# Patient Record
Sex: FEMALE | Race: NATIVE HAWAIIAN OR OTHER PACIFIC ISLANDER | URBAN - METROPOLITAN AREA
[De-identification: names, ages, dates, MRNs, and addresses within clinical notes are randomized per-mention and may not be internally consistent; named-entity substitution may affect disease eponyms.]

---

## 2022-04-14 ENCOUNTER — OFFICE VISIT (OUTPATIENT)
Dept: URBAN - METROPOLITAN AREA CLINIC 54 | Facility: CLINIC | Age: 47
End: 2022-04-14
Payer: COMMERCIAL

## 2022-04-14 ENCOUNTER — WEB ENCOUNTER (OUTPATIENT)
Dept: URBAN - METROPOLITAN AREA CLINIC 54 | Facility: CLINIC | Age: 47
End: 2022-04-14

## 2022-04-14 DIAGNOSIS — K80.50 BILIARY COLIC: ICD-10-CM

## 2022-04-14 DIAGNOSIS — R79.89 ELEVATED LIVER FUNCTION TESTS: ICD-10-CM

## 2022-04-14 PROCEDURE — 99204 OFFICE O/P NEW MOD 45 MIN: CPT | Performed by: INTERNAL MEDICINE

## 2022-04-14 RX ORDER — TRAMADOL HYDROCHLORIDE 50 MG/1
1 TABLET AS NEEDED TABLET, FILM COATED ORAL ONCE A DAY
Status: DISCONTINUED | COMMUNITY

## 2022-04-14 RX ORDER — MELOXICAM 15 MG/1
1 TABLET TABLET ORAL ONCE A DAY
Status: ACTIVE | COMMUNITY

## 2022-04-14 RX ORDER — LISINOPRIL 40 MG/1
1 TABLET TABLET ORAL ONCE A DAY
Status: ACTIVE | COMMUNITY

## 2022-04-14 RX ORDER — ONDANSETRON 4 MG/1
2 TABLETS ON THE TONGUE AND ALLOW TO DISSOLVE  AS NEEDED TABLET, ORALLY DISINTEGRATING ORAL
Status: DISCONTINUED | COMMUNITY

## 2022-04-14 RX ORDER — ALLOPURINOL 100 MG/1
1 TABLET TABLET ORAL ONCE A DAY
Status: ACTIVE | COMMUNITY

## 2022-04-14 RX ORDER — NITROFURANTOIN MACROCRYSTALS 100 MG/1
1 CAPSULE AT BEDTIME WITH FOOD OR MILK CAPSULE ORAL ONCE A DAY
Status: DISCONTINUED | COMMUNITY

## 2022-04-14 NOTE — HPI-TODAY'S VISIT:
46-year-old obese lady presenting to GI clinic after recent hospital presentation for right upper quadrant pain with a obstructive LFTs. Patient reports 3 episodes of right upper quadrant pain radiating to the middle of her back.  This last episode caused her to seek medical attention.  She reports the pain lasting 30 minutes postprandial.  She had some nausea and vomiting.  She presented to the ED on 315.  She was found to have an AST of 220 and ALT of 221 pill of 1.0 and alk phos of 321.  She had a normal white count.  Her lipase was normal.  Abdominal ultrasound showed multiple gallstones and a common bile duct of 5 mm.  Her pain resolved and she was discharged from the ER.

## 2022-04-15 LAB
A/G RATIO: 2
ALBUMIN: 4.9
ALKALINE PHOSPHATASE: 84
ALT (SGPT): 16
AST (SGOT): 20
BILIRUBIN, TOTAL: 0.3
BUN/CREATININE RATIO: 46
BUN: 42
CALCIUM: 9.9
CARBON DIOXIDE, TOTAL: 14
CHLORIDE: 110
CREATININE: 0.92
EGFR: 77
GGT: 94
GLOBULIN, TOTAL: 2.4
GLUCOSE: 89
HEMATOCRIT: 35.6
HEMOGLOBIN: 11.2
MCH: 29.6
MCHC: 31.5
MCV: 94
NRBC: (no result)
PLATELETS: 253
POTASSIUM: 5.1
PROTEIN, TOTAL: 7.3
RBC: 3.78
RDW: 13.1
SODIUM: 141
WBC: 6

## 2022-04-18 ENCOUNTER — TELEPHONE ENCOUNTER (OUTPATIENT)
Dept: URBAN - METROPOLITAN AREA CLINIC 54 | Facility: CLINIC | Age: 47
End: 2022-04-18

## 2022-05-27 ENCOUNTER — DASHBOARD ENCOUNTERS (OUTPATIENT)
Age: 47
End: 2022-05-27

## 2022-05-27 ENCOUNTER — OFFICE VISIT (OUTPATIENT)
Dept: URBAN - METROPOLITAN AREA CLINIC 54 | Facility: CLINIC | Age: 47
End: 2022-05-27
Payer: COMMERCIAL

## 2022-05-27 DIAGNOSIS — K80.20 SYMPTOMATIC CHOLELITHIASIS: ICD-10-CM

## 2022-05-27 DIAGNOSIS — Z12.11 COLON CANCER SCREENING: ICD-10-CM

## 2022-05-27 PROBLEM — 266474003: Status: ACTIVE | Noted: 2022-05-27

## 2022-05-27 PROCEDURE — 99214 OFFICE O/P EST MOD 30 MIN: CPT | Performed by: INTERNAL MEDICINE

## 2022-05-27 RX ORDER — SODIUM, POTASSIUM,MAG SULFATES 17.5-3.13G
354 ML SOLUTION, RECONSTITUTED, ORAL ORAL
Qty: 354 ML | Refills: 0 | OUTPATIENT
Start: 2022-05-27 | End: 2022-05-28

## 2022-05-27 RX ORDER — MELOXICAM 15 MG/1
1 TABLET TABLET ORAL ONCE A DAY
Status: ACTIVE | COMMUNITY

## 2022-05-27 RX ORDER — LISINOPRIL 40 MG/1
1 TABLET TABLET ORAL ONCE A DAY
Status: ACTIVE | COMMUNITY

## 2022-05-27 RX ORDER — ALLOPURINOL 100 MG/1
1 TABLET TABLET ORAL ONCE A DAY
Status: ACTIVE | COMMUNITY

## 2022-05-27 NOTE — HPI-TODAY'S VISIT:
46-year-old obese lady found to have right upper quadrant pain with obstructive LFTs.  She was having recurrent episodes when I saw her last GI clinic.  Her LFTs normalized abdominal ultrasound showed cholelithiasis with a common bile duct measuring 5 mm.  We subsequently got an MRCP which also concluded no filling defects within the common bile duct.  Patient feels well without any recurrent abdominal pain episodes.

## 2022-05-28 LAB
ALBUMIN: 4.6
ALKALINE PHOSPHATASE: 79
ALT (SGPT): 13
AST (SGOT): 15
BILIRUBIN, DIRECT: <0.1
BILIRUBIN, TOTAL: <0.2
PROTEIN, TOTAL: 7.4

## 2022-11-14 ENCOUNTER — OFFICE VISIT (OUTPATIENT)
Dept: URBAN - METROPOLITAN AREA CLINIC 54 | Facility: CLINIC | Age: 47
End: 2022-11-14